# Patient Record
Sex: MALE | Race: WHITE | NOT HISPANIC OR LATINO
[De-identification: names, ages, dates, MRNs, and addresses within clinical notes are randomized per-mention and may not be internally consistent; named-entity substitution may affect disease eponyms.]

---

## 2017-05-09 ENCOUNTER — APPOINTMENT (OUTPATIENT)
Dept: MRI IMAGING | Facility: CLINIC | Age: 57
End: 2017-05-09

## 2017-05-09 ENCOUNTER — OUTPATIENT (OUTPATIENT)
Dept: OUTPATIENT SERVICES | Facility: HOSPITAL | Age: 57
LOS: 1 days | End: 2017-05-09
Payer: COMMERCIAL

## 2017-05-09 DIAGNOSIS — Z00.8 ENCOUNTER FOR OTHER GENERAL EXAMINATION: ICD-10-CM

## 2017-05-09 PROCEDURE — 73721 MRI JNT OF LWR EXTRE W/O DYE: CPT

## 2020-12-04 ENCOUNTER — RESULT REVIEW (OUTPATIENT)
Age: 60
End: 2020-12-04

## 2022-03-21 ENCOUNTER — EMERGENCY (EMERGENCY)
Facility: HOSPITAL | Age: 62
LOS: 0 days | Discharge: HOME | End: 2022-03-21
Attending: EMERGENCY MEDICINE
Payer: COMMERCIAL

## 2022-03-21 VITALS
TEMPERATURE: 98 F | OXYGEN SATURATION: 100 % | RESPIRATION RATE: 18 BRPM | DIASTOLIC BLOOD PRESSURE: 91 MMHG | HEART RATE: 52 BPM | SYSTOLIC BLOOD PRESSURE: 125 MMHG

## 2022-03-21 VITALS — HEIGHT: 71 IN | WEIGHT: 160.06 LBS

## 2022-03-21 DIAGNOSIS — S61.012A LACERATION WITHOUT FOREIGN BODY OF LEFT THUMB WITHOUT DAMAGE TO NAIL, INITIAL ENCOUNTER: ICD-10-CM

## 2022-03-21 DIAGNOSIS — Z23 ENCOUNTER FOR IMMUNIZATION: ICD-10-CM

## 2022-03-21 DIAGNOSIS — E78.5 HYPERLIPIDEMIA, UNSPECIFIED: ICD-10-CM

## 2022-03-21 DIAGNOSIS — I25.10 ATHEROSCLEROTIC HEART DISEASE OF NATIVE CORONARY ARTERY WITHOUT ANGINA PECTORIS: ICD-10-CM
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
## 2022-03-21 PROCEDURE — 90471 IMMUNIZATION ADMIN: CPT

## 2022-03-21 PROCEDURE — 73130 X-RAY EXAM OF HAND: CPT | Mod: 26,LT

## 2022-03-21 PROCEDURE — 12001 RPR S/N/AX/GEN/TRNK 2.5CM/<: CPT

## 2022-03-21 PROCEDURE — 99284 EMERGENCY DEPT VISIT MOD MDM: CPT

## 2022-03-21 PROCEDURE — 90715 TDAP VACCINE 7 YRS/> IM: CPT

## 2022-03-21 PROCEDURE — 73130 X-RAY EXAM OF HAND: CPT | Mod: LT

## 2022-03-21 PROCEDURE — 73140 X-RAY EXAM OF FINGER(S): CPT | Mod: LT

## 2022-03-21 PROCEDURE — 99284 EMERGENCY DEPT VISIT MOD MDM: CPT | Mod: 25

## 2022-03-21 RX ORDER — TETANUS AND DIPHTHERIA TOXOIDS ADSORBED 2; 2 [LF]/.5ML; [LF]/.5ML
0.5 INJECTION INTRAMUSCULAR ONCE
Refills: 0 | Status: DISCONTINUED | OUTPATIENT
Start: 2022-03-21 | End: 2022-03-21

## 2022-03-21 RX ORDER — CEPHALEXIN 500 MG
500 CAPSULE ORAL ONCE
Refills: 0 | Status: COMPLETED | OUTPATIENT
Start: 2022-03-21 | End: 2022-03-21

## 2022-03-21 RX ORDER — CEPHALEXIN 500 MG
1 CAPSULE ORAL
Qty: 20 | Refills: 0
Start: 2022-03-21 | End: 2022-03-25

## 2022-03-21 RX ORDER — TETANUS TOXOID, REDUCED DIPHTHERIA TOXOID AND ACELLULAR PERTUSSIS VACCINE, ADSORBED 5; 2.5; 8; 8; 2.5 [IU]/.5ML; [IU]/.5ML; UG/.5ML; UG/.5ML; UG/.5ML
0.5 SUSPENSION INTRAMUSCULAR ONCE
Refills: 0 | Status: COMPLETED | OUTPATIENT
Start: 2022-03-21 | End: 2022-03-21

## 2022-03-21 RX ADMIN — TETANUS TOXOID, REDUCED DIPHTHERIA TOXOID AND ACELLULAR PERTUSSIS VACCINE, ADSORBED 0.5 MILLILITER(S): 5; 2.5; 8; 8; 2.5 SUSPENSION INTRAMUSCULAR at 16:55

## 2022-03-21 RX ADMIN — Medication 500 MILLIGRAM(S): at 16:54

## 2022-03-21 NOTE — ED STATDOCS - CLINICAL SUMMARY MEDICAL DECISION MAKING FREE TEXT BOX
pt with thumb laceration, possible tendon injury. will explore tendon and if involved will call hand but if not, will repair.

## 2022-03-21 NOTE — ED STATDOCS - ATTENDING CONTRIBUTION TO CARE
I personally saw the patient with the PETR, and completed the key components of the history and physical exam. I then discussed the management plan with the PETR.

## 2022-03-21 NOTE — ED STATDOCS - NS ED ATTENDING STATEMENT MOD
This was a shared visit with the PETR. I reviewed and verified the documentation and independently performed the documented:

## 2022-03-21 NOTE — ED ADULT NURSE NOTE - OBJECTIVE STATEMENT
Pt presents to ED after cutting his L thumb with utility knife. +Laceration noted to L thumb, bleeding minimally. Denies blood thinner use. Pt not UTD on tetanus.

## 2022-03-21 NOTE — ED STATDOCS - NSICDXPASTSURGICALHX_GEN_ALL_CORE_FT
PAST SURGICAL HISTORY:  Rotator Cuff Tear repair R shoulder 2008    S/P Hernia Repair [facility] 2008

## 2022-03-21 NOTE — ED ADULT TRIAGE NOTE - WEIGHT IN LBS
OFFICE VISIT: Denies any N/V, HA, Cramping, VB, LOF, Edema, Domestic Violence, Smoking  + FM  Tolerating PNV  30 week packet reviewed with patient  Plans on breast feeding  Plans on   Failed 3 hour GTT, referral for MFM placed for diabetic educator, will continue to follow with [facility] MFM  Will get TDap today  RTO 2 weeks or sooner as needed 
160

## 2022-03-21 NOTE — ED STATDOCS - PATIENT PORTAL LINK FT
You can access the FollowMyHealth Patient Portal offered by [facility] by registering at the following website: [email or web address]. By joining [employer]’s FollowMyHealth portal, you will also be able to view your health information using other applications (apps) compatible with our system.

## 2022-03-21 NOTE — ED ADULT NURSE NOTE - NSICDXPASTSURGICALHX_GEN_ALL_CORE_FT
PAST SURGICAL HISTORY:  Rotator Cuff Tear repair R shoulder 2008    S/P Hernia Repair [facility] 2008

## 2022-03-21 NOTE — ED STATDOCS - OBJECTIVE STATEMENT
62 M hx CAD, chronic colitis, HLD here c/o laceration to left hand. reports he was cutting a piece of Styrofoam with a utility knife when he cut himself accidentally. on baby ASA.  no other injuries. Tetanus not up to date.

## 2022-03-21 NOTE — ED STATDOCS - CARE PROVIDER_API CALL
Mohsen Hendricks)  Orthopaedic Surgery; Surgery of the Hand  [address] [place] [address]  Phone: [phone]  Fax: [phone]  Follow Up Time:

## 2022-03-21 NOTE — ED STATDOCS - NSICDXPASTMEDICALHX_GEN_ALL_CORE_FT
PAST MEDICAL HISTORY:  CAD (coronary artery disease)     Chronic colitis     History of cardiac cath 2008 with CAD treated medically as per pt    Hyperlipidemia

## 2022-03-21 NOTE — ED STATDOCS - PROGRESS NOTE DETAILS
Patient seen and evaluated.  irrigated laceration Patient seen and evaluated.  irrigated laceration, unable to fully visualize tendon, patient unable to fully extend thumb although able to resist against extension.  Given limited extension, case was d/w on call hand specialist, Dr. Hendricks, for evaluation.  Laceration repaired by orthopedic team.  Patient to be d/c on abx.  Return precautions reviewed -Chandler Antunez PA-C

## 2022-03-21 NOTE — ED STATDOCS - MUSCULOSKELETAL, MLM
range of motion is not limited and there is no muscle tenderness. + 2.5 cm laceration on the dorsum of the left hand, neurovascularly intact, motor 5/5

## 2022-03-21 NOTE — CONSULT NOTE ADULT - SUBJECTIVE AND OBJECTIVE BOX
62y Male RHD with c/o L Laceration s/p box-cutter injury. Denies HS/LOC. Denies numbness/tingling. No other pain/injuries. Denies fevers/chills. Patient rec'd tetanus in ed.       HEALTH ISSUES - PROBLEM Dx:        MEDICATIONS  (STANDING):  diphtheria/tetanus Vaccine - Adult 0.5 milliLiter(s) IntraMuscular once    Allergies    No Known Allergies    Intolerances        Physical Exam  Gen: NAD  LUE: Warm/well perfused, at 2 lacerations noted to dorsal aspect of the thumb proximal phalanx. Able to actively extend against resistance. Patient sensation intact to light touch, with grossly  intact flexion/extension at PIP/DIP joints. FDS/FDP grossly intact       Procedure:   L hand was prepped and draped in sterile fashion. 1% lidocaine was used for regional anesthesia of the injured area. The laceration was copiously irrigated and found to have intact extensor tendon and approximated using 3-0 nylon sutures. The laceration was dressed with dry dressings. The patient tolerated the procedure well. NVI post-procedure.     Imaging  XR L Hand: No acute fracture or dislocation            Vital Signs Last 24 Hrs  T(C): 36.8 (03-21-22 @ 14:51), Max: 36.8 (03-21-22 @ 14:51)  T(F): 98.2 (03-21-22 @ 14:51), Max: 98.2 (03-21-22 @ 14:51)  HR: 52 (03-21-22 @ 14:51) (52 - 52)  BP: 125/91 (03-21-22 @ 14:51) (125/91 - 125/91)  BP(mean): 101 (03-21-22 @ 14:51) (101 - 101)  RR: 18 (03-21-22 @ 14:51) (18 - 18)  SpO2: 100% (03-21-22 @ 14:51) (100% - 100%)    A/P: 62y Male with L thumb laceration  Pain control  Ice/elevate as needed  Keep dressing clean and dry  Pt to be sent home with Keflex for 1 wk.   All question answered  Follow up with Dr. Hendricks as outpatient within 1 week, call office for appointment   Ortho stable for discharge

## 2022-03-30 NOTE — ED ADULT TRIAGE NOTE - PAIN: PRESENCE, MLM
complains of pain/discomfort
Alert-The patient is alert, awake and responds to voice. The patient is oriented to time, place, and person. The triage nurse is able to obtain subjective information.

## 2023-08-01 ENCOUNTER — APPOINTMENT (OUTPATIENT)
Dept: HEPATOLOGY | Facility: CLINIC | Age: 63
End: 2023-08-01
Payer: COMMERCIAL

## 2023-08-01 DIAGNOSIS — R79.89 OTHER SPECIFIED ABNORMAL FINDINGS OF BLOOD CHEMISTRY: ICD-10-CM

## 2023-08-01 PROCEDURE — 76981 USE PARENCHYMA: CPT

## 2023-08-02 PROBLEM — R79.89 ABNORMAL LFTS: Status: ACTIVE | Noted: 2023-08-02

## 2023-10-12 PROBLEM — Z00.00 ENCOUNTER FOR PREVENTIVE HEALTH EXAMINATION: Noted: 2023-10-12

## 2023-12-17 ENCOUNTER — EMERGENCY (EMERGENCY)
Facility: HOSPITAL | Age: 63
LOS: 0 days | Discharge: HOME | End: 2023-12-17
Attending: STUDENT IN AN ORGANIZED HEALTH CARE EDUCATION/TRAINING PROGRAM
Payer: COMMERCIAL

## 2023-12-17 VITALS
HEART RATE: 61 BPM | TEMPERATURE: 98 F | OXYGEN SATURATION: 100 % | RESPIRATION RATE: 18 BRPM | DIASTOLIC BLOOD PRESSURE: 75 MMHG | SYSTOLIC BLOOD PRESSURE: 114 MMHG

## 2023-12-17 VITALS
HEART RATE: 72 BPM | TEMPERATURE: 98 F | RESPIRATION RATE: 20 BRPM | SYSTOLIC BLOOD PRESSURE: 156 MMHG | DIASTOLIC BLOOD PRESSURE: 72 MMHG | WEIGHT: 169.98 LBS | OXYGEN SATURATION: 100 %

## 2023-12-17 DIAGNOSIS — R25.1 TREMOR, UNSPECIFIED: ICD-10-CM

## 2023-12-17 DIAGNOSIS — R11.2 NAUSEA WITH VOMITING, UNSPECIFIED: ICD-10-CM

## 2023-12-17 DIAGNOSIS — G25.1 DRUG-INDUCED TREMOR: ICD-10-CM

## 2023-12-17 DIAGNOSIS — T40.711A POISONING BY CANNABIS, ACCIDENTAL (UNINTENTIONAL), INITIAL ENCOUNTER: ICD-10-CM

## 2023-12-17 LAB
ALBUMIN SERPL ELPH-MCNC: 4.2 G/DL — SIGNIFICANT CHANGE UP (ref 3.3–5)
ALBUMIN SERPL ELPH-MCNC: 4.2 G/DL — SIGNIFICANT CHANGE UP (ref 3.3–5)
ALP SERPL-CCNC: 52 U/L — SIGNIFICANT CHANGE UP (ref 40–120)
ALP SERPL-CCNC: 52 U/L — SIGNIFICANT CHANGE UP (ref 40–120)
ALT FLD-CCNC: 42 U/L — SIGNIFICANT CHANGE UP (ref 12–78)
ALT FLD-CCNC: 42 U/L — SIGNIFICANT CHANGE UP (ref 12–78)
ANION GAP SERPL CALC-SCNC: 17 MMOL/L — SIGNIFICANT CHANGE UP (ref 5–17)
ANION GAP SERPL CALC-SCNC: 17 MMOL/L — SIGNIFICANT CHANGE UP (ref 5–17)
AST SERPL-CCNC: 33 U/L — SIGNIFICANT CHANGE UP (ref 15–37)
AST SERPL-CCNC: 33 U/L — SIGNIFICANT CHANGE UP (ref 15–37)
BASOPHILS # BLD AUTO: 0.04 K/UL — SIGNIFICANT CHANGE UP (ref 0–0.2)
BASOPHILS # BLD AUTO: 0.04 K/UL — SIGNIFICANT CHANGE UP (ref 0–0.2)
BASOPHILS NFR BLD AUTO: 0.2 % — SIGNIFICANT CHANGE UP (ref 0–2)
BASOPHILS NFR BLD AUTO: 0.2 % — SIGNIFICANT CHANGE UP (ref 0–2)
BILIRUB SERPL-MCNC: 2.2 MG/DL — HIGH (ref 0.2–1.2)
BILIRUB SERPL-MCNC: 2.2 MG/DL — HIGH (ref 0.2–1.2)
BUN SERPL-MCNC: 16 MG/DL — SIGNIFICANT CHANGE UP (ref 7–23)
BUN SERPL-MCNC: 16 MG/DL — SIGNIFICANT CHANGE UP (ref 7–23)
CALCIUM SERPL-MCNC: 9.3 MG/DL — SIGNIFICANT CHANGE UP (ref 8.5–10.1)
CALCIUM SERPL-MCNC: 9.3 MG/DL — SIGNIFICANT CHANGE UP (ref 8.5–10.1)
CHLORIDE SERPL-SCNC: 103 MMOL/L — SIGNIFICANT CHANGE UP (ref 96–108)
CHLORIDE SERPL-SCNC: 103 MMOL/L — SIGNIFICANT CHANGE UP (ref 96–108)
CO2 SERPL-SCNC: 19 MMOL/L — LOW (ref 22–31)
CO2 SERPL-SCNC: 19 MMOL/L — LOW (ref 22–31)
CREAT SERPL-MCNC: 1.45 MG/DL — HIGH (ref 0.5–1.3)
CREAT SERPL-MCNC: 1.45 MG/DL — HIGH (ref 0.5–1.3)
EGFR: 54 ML/MIN/1.73M2 — LOW
EGFR: 54 ML/MIN/1.73M2 — LOW
EOSINOPHIL # BLD AUTO: 0.11 K/UL — SIGNIFICANT CHANGE UP (ref 0–0.5)
EOSINOPHIL # BLD AUTO: 0.11 K/UL — SIGNIFICANT CHANGE UP (ref 0–0.5)
EOSINOPHIL NFR BLD AUTO: 0.7 % — SIGNIFICANT CHANGE UP (ref 0–6)
EOSINOPHIL NFR BLD AUTO: 0.7 % — SIGNIFICANT CHANGE UP (ref 0–6)
GLUCOSE SERPL-MCNC: 216 MG/DL — HIGH (ref 70–99)
GLUCOSE SERPL-MCNC: 216 MG/DL — HIGH (ref 70–99)
HCT VFR BLD CALC: 42.6 % — SIGNIFICANT CHANGE UP (ref 39–50)
HCT VFR BLD CALC: 42.6 % — SIGNIFICANT CHANGE UP (ref 39–50)
HGB BLD-MCNC: 15.3 G/DL — SIGNIFICANT CHANGE UP (ref 13–17)
HGB BLD-MCNC: 15.3 G/DL — SIGNIFICANT CHANGE UP (ref 13–17)
IMM GRANULOCYTES NFR BLD AUTO: 0.7 % — SIGNIFICANT CHANGE UP (ref 0–0.9)
IMM GRANULOCYTES NFR BLD AUTO: 0.7 % — SIGNIFICANT CHANGE UP (ref 0–0.9)
LIDOCAIN IGE QN: 26 U/L — SIGNIFICANT CHANGE UP (ref 13–75)
LIDOCAIN IGE QN: 26 U/L — SIGNIFICANT CHANGE UP (ref 13–75)
LYMPHOCYTES # BLD AUTO: 1.62 K/UL — SIGNIFICANT CHANGE UP (ref 1–3.3)
LYMPHOCYTES # BLD AUTO: 1.62 K/UL — SIGNIFICANT CHANGE UP (ref 1–3.3)
LYMPHOCYTES # BLD AUTO: 10 % — LOW (ref 13–44)
LYMPHOCYTES # BLD AUTO: 10 % — LOW (ref 13–44)
MCHC RBC-ENTMCNC: 31.5 PG — SIGNIFICANT CHANGE UP (ref 27–34)
MCHC RBC-ENTMCNC: 31.5 PG — SIGNIFICANT CHANGE UP (ref 27–34)
MCHC RBC-ENTMCNC: 35.9 GM/DL — SIGNIFICANT CHANGE UP (ref 32–36)
MCHC RBC-ENTMCNC: 35.9 GM/DL — SIGNIFICANT CHANGE UP (ref 32–36)
MCV RBC AUTO: 87.7 FL — SIGNIFICANT CHANGE UP (ref 80–100)
MCV RBC AUTO: 87.7 FL — SIGNIFICANT CHANGE UP (ref 80–100)
MONOCYTES # BLD AUTO: 0.9 K/UL — SIGNIFICANT CHANGE UP (ref 0–0.9)
MONOCYTES # BLD AUTO: 0.9 K/UL — SIGNIFICANT CHANGE UP (ref 0–0.9)
MONOCYTES NFR BLD AUTO: 5.6 % — SIGNIFICANT CHANGE UP (ref 2–14)
MONOCYTES NFR BLD AUTO: 5.6 % — SIGNIFICANT CHANGE UP (ref 2–14)
NEUTROPHILS # BLD AUTO: 13.38 K/UL — HIGH (ref 1.8–7.4)
NEUTROPHILS # BLD AUTO: 13.38 K/UL — HIGH (ref 1.8–7.4)
NEUTROPHILS NFR BLD AUTO: 82.8 % — HIGH (ref 43–77)
NEUTROPHILS NFR BLD AUTO: 82.8 % — HIGH (ref 43–77)
PLATELET # BLD AUTO: 176 K/UL — SIGNIFICANT CHANGE UP (ref 150–400)
PLATELET # BLD AUTO: 176 K/UL — SIGNIFICANT CHANGE UP (ref 150–400)
POTASSIUM SERPL-MCNC: 3.6 MMOL/L — SIGNIFICANT CHANGE UP (ref 3.5–5.3)
POTASSIUM SERPL-MCNC: 3.6 MMOL/L — SIGNIFICANT CHANGE UP (ref 3.5–5.3)
POTASSIUM SERPL-SCNC: 3.6 MMOL/L — SIGNIFICANT CHANGE UP (ref 3.5–5.3)
POTASSIUM SERPL-SCNC: 3.6 MMOL/L — SIGNIFICANT CHANGE UP (ref 3.5–5.3)
PROT SERPL-MCNC: 7.6 GM/DL — SIGNIFICANT CHANGE UP (ref 6–8.3)
PROT SERPL-MCNC: 7.6 GM/DL — SIGNIFICANT CHANGE UP (ref 6–8.3)
RBC # BLD: 4.86 M/UL — SIGNIFICANT CHANGE UP (ref 4.2–5.8)
RBC # BLD: 4.86 M/UL — SIGNIFICANT CHANGE UP (ref 4.2–5.8)
RBC # FLD: 11.4 % — SIGNIFICANT CHANGE UP (ref 10.3–14.5)
RBC # FLD: 11.4 % — SIGNIFICANT CHANGE UP (ref 10.3–14.5)
SODIUM SERPL-SCNC: 139 MMOL/L — SIGNIFICANT CHANGE UP (ref 135–145)
SODIUM SERPL-SCNC: 139 MMOL/L — SIGNIFICANT CHANGE UP (ref 135–145)
WBC # BLD: 16.17 K/UL — HIGH (ref 3.8–10.5)
WBC # BLD: 16.17 K/UL — HIGH (ref 3.8–10.5)
WBC # FLD AUTO: 16.17 K/UL — HIGH (ref 3.8–10.5)
WBC # FLD AUTO: 16.17 K/UL — HIGH (ref 3.8–10.5)

## 2023-12-17 PROCEDURE — 96374 THER/PROPH/DIAG INJ IV PUSH: CPT

## 2023-12-17 PROCEDURE — 83690 ASSAY OF LIPASE: CPT

## 2023-12-17 PROCEDURE — 82962 GLUCOSE BLOOD TEST: CPT

## 2023-12-17 PROCEDURE — 85025 COMPLETE CBC W/AUTO DIFF WBC: CPT

## 2023-12-17 PROCEDURE — 80053 COMPREHEN METABOLIC PANEL: CPT

## 2023-12-17 PROCEDURE — 36415 COLL VENOUS BLD VENIPUNCTURE: CPT

## 2023-12-17 PROCEDURE — 93010 ELECTROCARDIOGRAM REPORT: CPT

## 2023-12-17 PROCEDURE — 99284 EMERGENCY DEPT VISIT MOD MDM: CPT

## 2023-12-17 PROCEDURE — 93005 ELECTROCARDIOGRAM TRACING: CPT

## 2023-12-17 PROCEDURE — 99284 EMERGENCY DEPT VISIT MOD MDM: CPT | Mod: 25

## 2023-12-17 RX ORDER — SODIUM CHLORIDE 9 MG/ML
1000 INJECTION INTRAMUSCULAR; INTRAVENOUS; SUBCUTANEOUS ONCE
Refills: 0 | Status: COMPLETED | OUTPATIENT
Start: 2023-12-17 | End: 2023-12-17

## 2023-12-17 RX ORDER — ONDANSETRON 8 MG/1
4 TABLET, FILM COATED ORAL ONCE
Refills: 0 | Status: COMPLETED | OUTPATIENT
Start: 2023-12-17 | End: 2023-12-17

## 2023-12-17 RX ADMIN — ONDANSETRON 4 MILLIGRAM(S): 8 TABLET, FILM COATED ORAL at 17:39

## 2023-12-17 RX ADMIN — SODIUM CHLORIDE 1000 MILLILITER(S): 9 INJECTION INTRAMUSCULAR; INTRAVENOUS; SUBCUTANEOUS at 17:39

## 2023-12-17 NOTE — ED PROVIDER NOTE - OBJECTIVE STATEMENT
64 y/o male c/o nausea/vomiting and tremors s/p ingesting marijuana edible and a beer at 1:30pm then a xanax 45 min pta. Pt states he took an edible a few years prior and had a similar symptoms. pt states he feels anxious. currently covered in vomit. denies cp, palpitations, syncope. pts son with him and also took the edibles. 62 y/o male c/o nausea/vomiting and tremors s/p ingesting marijuana edible and a beer at 1:30pm then a xanax 45 min pta. Pt states he took an edible a few years prior and had a similar symptoms. pt states he feels anxious. currently covered in vomit. denies cp, palpitations, syncope. pts son with him and also took the edibles.

## 2023-12-17 NOTE — ED PROVIDER NOTE - PROGRESS NOTE DETAILS
Joanne PAC: pt is feeling back to baseline. tolerated PO. pt walked with a steady gait. pts family will take him home

## 2023-12-17 NOTE — ED PROVIDER NOTE - PHYSICAL EXAMINATION
General: NAD   Eyes: no scleral icterus  Head:  NC/AT, dry oral mucosa moist  Neck: trachea midline  Lungs: CTA b/l  CVS: RRR  Abd: s/nt/nd  Ext: no deformities   Neuro: AAOx3

## 2023-12-17 NOTE — ED ADULT NURSE NOTE - OBJECTIVE STATEMENT
Pt BIBEMS AOx4 from home c/o nausea/vomiting and tremors s/p ingesting marijuana edible and a beer at 1:30pm then a xanax 45 min pta. Pt states he took an edible a few years prior and had a simialr rx. , he feels pounding on his head but denies any pain

## 2023-12-17 NOTE — ED ADULT TRIAGE NOTE - CHIEF COMPLAINT QUOTE
Pt BIBEMS AOx4 Pt BIBEMS AOx4 from home c/o nausea/vomiting and tremors s/p ingesting marijuana edible and a beer at 1:30pm then a xanax 45 min pta. Pt states he took an edible a few years prior and had a simialr rx.

## 2023-12-17 NOTE — ED PROVIDER NOTE - PATIENT PORTAL LINK FT
You can access the FollowMyHealth Patient Portal offered by [facility] by registering at the following website: [email or web address]. By joining [employer]’s FollowMyHealth portal, you will also be able to view your health information using other applications (apps) compatible with our system. You can access the FollowMyHealth Patient Portal offered by [facility] by registering at the following website: [email or web address]. By joining [employer]’s FollowMyHealth portal, you will also be able to view your health information using other applications (apps) compatible with our system.

## 2023-12-17 NOTE — ED PROVIDER NOTE - NSICDXPASTSURGICALHX_GEN_ALL_CORE_FT
PAST SURGICAL HISTORY:  Rotator Cuff Tear repair R shoulder 2008    S/P Hernia Repair [facility] 2008     PAST SURGICAL HISTORY:  Rotator Cuff Tear repair R shoulder 2008    S/P Hernia Repair [facility] 2008

## 2023-12-17 NOTE — ED ADULT NURSE NOTE - CHIEF COMPLAINT QUOTE
Pt BIBEMS AOx4 from home c/o nausea/vomiting and tremors s/p ingesting marijuana edible and a beer at 1:30pm then a xanax 45 min pta. Pt states he took an edible a few years prior and had a simialr rx.

## 2023-12-17 NOTE — ED STATDOCS - CLINICAL SUMMARY MEDICAL DECISION MAKING FREE TEXT BOX
pt 62yo m c/o nausea/vomiting and tremors s/p ingesting marijuana edible and a beer at 1:30pm then a xanax 45 min pta. Pt states he took an edible a few years prior and had a simialr rx. pt states he feels anxious. currently covered in vomit. denies cp, palpitations, syncope  pts son with him and also took the edibles

## 2023-12-17 NOTE — ED PROVIDER NOTE - ATTENDING APP SHARED VISIT CONTRIBUTION OF CARE
I, Rosendo Foster DO, personally saw the patient with ACP.  I have personally performed a face to face diagnostic evaluation on this patient.  I have reviewed the ACP note and agree with the history, exam, and plan of care, except as noted.  The initial assessment was performed by myself and then the patient was handed off to the ACP. The patient was followed and re-evaluated by the ACP. All labs, imaging and procedures were evaluated and performed by the ACP and I was available for consultation if any questions in the patients care came up.

## 2023-12-17 NOTE — ED PROVIDER NOTE - CLINICAL SUMMARY MEDICAL DECISION MAKING FREE TEXT BOX
pt 64yo m c/o nausea/vomiting and tremors s/p ingesting marijuana edible and a beer at 1:30pm then a xanax 45 min pta. Pt states he took an edible a few years prior and had a simialr rx. pt states he feels anxious. currently covered in vomit. denies cp, palpitations, syncope. pts son with him and also took the edibles. pt 62yo m c/o nausea/vomiting and tremors s/p ingesting marijuana edible and a beer at 1:30pm then a xanax 45 min pta. Pt states he took an edible a few years prior and had a simialr rx. pt states he feels anxious. currently covered in vomit. denies cp, palpitations, syncope. pts son with him and also took the edibles.  pt is feeling back to baseline. tolerated PO. pts family will take him home pt 64yo m c/o nausea/vomiting and tremors s/p ingesting marijuana edible and a beer at 1:30pm then a xanax 45 min pta. Pt states he took an edible a few years prior and had a simialr rx. pt states he feels anxious. currently covered in vomit. denies cp, palpitations, syncope. pts son with him and also took the edibles.  pt is feeling back to baseline. tolerated PO. pts family will take him home pt 62yo m c/o nausea/vomiting and tremors s/p ingesting marijuana edible and a beer at 1:30pm then a xanax 45 min pta. Pt states he took an edible a few years prior and had a simialr rx. pt states he feels anxious. currently covered in vomit. denies cp, palpitations, syncope. pts son with him and also took the edibles.

## 2024-01-29 NOTE — ED STATDOCS - NS_ATTENDINGSCRIBE_ED_ALL_ED
[Alert] : alert [Oriented x 3] : ~L oriented x 3 [Well Nourished] : well nourished [FreeTextEntry3] : Light pink, scaly patches around anus Mild resolving fissure of gluteal crease I personally performed the service described in the documentation recorded by the scribe in my presence, and it accurately and completely records my words and actions.

## 2024-01-31 ENCOUNTER — OUTPATIENT (OUTPATIENT)
Dept: OUTPATIENT SERVICES | Facility: HOSPITAL | Age: 64
LOS: 1 days | End: 2024-01-31
Payer: COMMERCIAL

## 2024-01-31 VITALS
RESPIRATION RATE: 18 BRPM | SYSTOLIC BLOOD PRESSURE: 119 MMHG | DIASTOLIC BLOOD PRESSURE: 68 MMHG | TEMPERATURE: 98 F | HEART RATE: 56 BPM | OXYGEN SATURATION: 98 % | HEIGHT: 71 IN | WEIGHT: 191.8 LBS

## 2024-01-31 DIAGNOSIS — K40.90 UNILATERAL INGUINAL HERNIA, WITHOUT OBSTRUCTION OR GANGRENE, NOT SPECIFIED AS RECURRENT: ICD-10-CM

## 2024-01-31 DIAGNOSIS — Z01.818 ENCOUNTER FOR OTHER PREPROCEDURAL EXAMINATION: ICD-10-CM

## 2024-01-31 LAB
ALBUMIN SERPL ELPH-MCNC: 4.2 G/DL — SIGNIFICANT CHANGE UP (ref 3.3–5)
ALP SERPL-CCNC: 44 U/L — SIGNIFICANT CHANGE UP (ref 30–120)
ALT FLD-CCNC: 37 U/L — SIGNIFICANT CHANGE UP (ref 10–60)
ANION GAP SERPL CALC-SCNC: 8 MMOL/L — SIGNIFICANT CHANGE UP (ref 5–17)
AST SERPL-CCNC: 25 U/L — SIGNIFICANT CHANGE UP (ref 10–40)
BILIRUB SERPL-MCNC: 2 MG/DL — HIGH (ref 0.2–1.2)
BUN SERPL-MCNC: 13 MG/DL — SIGNIFICANT CHANGE UP (ref 7–23)
CALCIUM SERPL-MCNC: 9.3 MG/DL — SIGNIFICANT CHANGE UP (ref 8.4–10.5)
CHLORIDE SERPL-SCNC: 104 MMOL/L — SIGNIFICANT CHANGE UP (ref 96–108)
CO2 SERPL-SCNC: 27 MMOL/L — SIGNIFICANT CHANGE UP (ref 22–31)
CREAT SERPL-MCNC: 1.1 MG/DL — SIGNIFICANT CHANGE UP (ref 0.5–1.3)
EGFR: 75 ML/MIN/1.73M2 — SIGNIFICANT CHANGE UP
GLUCOSE SERPL-MCNC: 96 MG/DL — SIGNIFICANT CHANGE UP (ref 70–99)
HCT VFR BLD CALC: 45.7 % — SIGNIFICANT CHANGE UP (ref 39–50)
HGB BLD-MCNC: 15.7 G/DL — SIGNIFICANT CHANGE UP (ref 13–17)
MCHC RBC-ENTMCNC: 30.8 PG — SIGNIFICANT CHANGE UP (ref 27–34)
MCHC RBC-ENTMCNC: 34.4 GM/DL — SIGNIFICANT CHANGE UP (ref 32–36)
MCV RBC AUTO: 89.8 FL — SIGNIFICANT CHANGE UP (ref 80–100)
NRBC # BLD: 0 /100 WBCS — SIGNIFICANT CHANGE UP (ref 0–0)
PLATELET # BLD AUTO: 179 K/UL — SIGNIFICANT CHANGE UP (ref 150–400)
POTASSIUM SERPL-MCNC: 4.2 MMOL/L — SIGNIFICANT CHANGE UP (ref 3.5–5.3)
POTASSIUM SERPL-SCNC: 4.2 MMOL/L — SIGNIFICANT CHANGE UP (ref 3.5–5.3)
PROT SERPL-MCNC: 7.7 G/DL — SIGNIFICANT CHANGE UP (ref 6–8.3)
RBC # BLD: 5.09 M/UL — SIGNIFICANT CHANGE UP (ref 4.2–5.8)
RBC # FLD: 11.5 % — SIGNIFICANT CHANGE UP (ref 10.3–14.5)
SODIUM SERPL-SCNC: 139 MMOL/L — SIGNIFICANT CHANGE UP (ref 135–145)
WBC # BLD: 10.61 K/UL — HIGH (ref 3.8–10.5)
WBC # FLD AUTO: 10.61 K/UL — HIGH (ref 3.8–10.5)

## 2024-01-31 PROCEDURE — 93010 ELECTROCARDIOGRAM REPORT: CPT

## 2024-01-31 PROCEDURE — G0463: CPT

## 2024-01-31 PROCEDURE — 36415 COLL VENOUS BLD VENIPUNCTURE: CPT

## 2024-01-31 PROCEDURE — 80053 COMPREHEN METABOLIC PANEL: CPT

## 2024-01-31 PROCEDURE — 93005 ELECTROCARDIOGRAM TRACING: CPT

## 2024-01-31 PROCEDURE — 85027 COMPLETE CBC AUTOMATED: CPT

## 2024-01-31 RX ORDER — ROSUVASTATIN CALCIUM 5 MG/1
1 TABLET ORAL
Refills: 0 | DISCHARGE

## 2024-01-31 RX ORDER — MIRTAZAPINE 45 MG/1
0.5 TABLET, ORALLY DISINTEGRATING ORAL
Refills: 0 | DISCHARGE

## 2024-01-31 RX ORDER — MESALAMINE 400 MG
2 TABLET, DELAYED RELEASE (ENTERIC COATED) ORAL
Refills: 0 | DISCHARGE

## 2024-01-31 RX ORDER — ASPIRIN/CALCIUM CARB/MAGNESIUM 324 MG
0 TABLET ORAL
Refills: 0 | DISCHARGE

## 2024-01-31 RX ORDER — EZETIMIBE 10 MG/1
1 TABLET ORAL
Refills: 0 | DISCHARGE

## 2024-01-31 NOTE — H&P PST ADULT - HISTORY OF PRESENT ILLNESS
64 y/o male present with right Inguinal Hernia. Patient denies pain  He is scheduled for Right Inguinal Hernia  Repair on 02/12/2024

## 2024-01-31 NOTE — H&P PST ADULT - NSICDXFAMILYHX_GEN_ALL_CORE_FT
FAMILY HISTORY:  Father  Still living? Yes, Estimated age: Age Unknown  FH: hypertension, Age at diagnosis: Age Unknown  FH: prostate cancer, Age at diagnosis: Age Unknown

## 2024-01-31 NOTE — H&P PST ADULT - NSICDXPASTSURGICALHX_GEN_ALL_CORE_FT
PAST SURGICAL HISTORY:  Rotator Cuff Tear repair R shoulder 2008    S/P Hernia Repair [facility] 2008

## 2024-01-31 NOTE — H&P PST ADULT - PSYCHIATRIC
alert and oriented x3 Post-Care Instructions: I reviewed with the patient in detail post-care instructions. Patient is to keep the biopsy site dry overnight, and then apply bacitracin twice daily until healed. Patient may apply hydrogen peroxide soaks to remove any crusting. details…

## 2024-01-31 NOTE — H&P PST ADULT - PROBLEM SELECTOR PLAN 1
Scheduled for Right Inguinal Hernia Repair  pre-op instructions provided  Will obtain cardiology and medical clearance

## 2024-01-31 NOTE — H&P PST ADULT - NSICDXPASTMEDICALHX_GEN_ALL_CORE_FT
PAST MEDICAL HISTORY:  CAD (coronary artery disease)     Chronic colitis     History of cardiac cath 2008 with CAD treated medically as per pt    Hyperlipidemia     Right inguinal hernia

## 2025-05-27 NOTE — ED ADULT NURSE NOTE - NS ED NURSE RECORD ANOTHER HT AND WT
Patient [place] is 32 year [place] was [place], suspected use of drug, patient denies use of drugs today, states last heroin and alcohol use was \"three days ago\" no head ache, CP,SOB. No pain at this time. A&Ox4, NKA,   Patient is lethargic at this time and arousal to sound.    SB on arrival    Belongings collected and provided to public safety   No SI/HI, AH,VH  
Yes